# Patient Record
Sex: MALE
[De-identification: names, ages, dates, MRNs, and addresses within clinical notes are randomized per-mention and may not be internally consistent; named-entity substitution may affect disease eponyms.]

---

## 2018-02-05 PROBLEM — Z00.00 ENCOUNTER FOR PREVENTIVE HEALTH EXAMINATION: Status: ACTIVE | Noted: 2018-02-05

## 2018-02-07 ENCOUNTER — OTHER (OUTPATIENT)
Age: 48
End: 2018-02-07

## 2018-02-07 DIAGNOSIS — M25.569 PAIN IN UNSPECIFIED KNEE: ICD-10-CM

## 2018-02-26 ENCOUNTER — APPOINTMENT (OUTPATIENT)
Dept: ORTHOPEDIC SURGERY | Facility: CLINIC | Age: 48
End: 2018-02-26

## 2018-09-06 ENCOUNTER — APPOINTMENT (OUTPATIENT)
Dept: ORTHOPEDIC SURGERY | Facility: CLINIC | Age: 48
End: 2018-09-06
Payer: COMMERCIAL

## 2018-09-06 VITALS
HEIGHT: 72 IN | SYSTOLIC BLOOD PRESSURE: 125 MMHG | BODY MASS INDEX: 30.88 KG/M2 | DIASTOLIC BLOOD PRESSURE: 81 MMHG | HEART RATE: 64 BPM | WEIGHT: 228 LBS

## 2018-09-06 DIAGNOSIS — Z80.9 FAMILY HISTORY OF MALIGNANT NEOPLASM, UNSPECIFIED: ICD-10-CM

## 2018-09-06 DIAGNOSIS — M54.5 LOW BACK PAIN: ICD-10-CM

## 2018-09-06 DIAGNOSIS — M54.16 RADICULOPATHY, LUMBAR REGION: ICD-10-CM

## 2018-09-06 DIAGNOSIS — Z82.61 FAMILY HISTORY OF ARTHRITIS: ICD-10-CM

## 2018-09-06 DIAGNOSIS — Z87.39 PERSONAL HISTORY OF OTHER DISEASES OF THE MUSCULOSKELETAL SYSTEM AND CONNECTIVE TISSUE: ICD-10-CM

## 2018-09-06 PROCEDURE — 72100 X-RAY EXAM L-S SPINE 2/3 VWS: CPT

## 2018-09-06 PROCEDURE — 99205 OFFICE O/P NEW HI 60 MIN: CPT

## 2018-09-06 RX ORDER — OXYCODONE HYDROCHLORIDE 30 MG/1
TABLET ORAL
Refills: 0 | Status: ACTIVE | COMMUNITY

## 2018-09-06 RX ORDER — ZOLPIDEM TARTRATE 5 MG/1
TABLET ORAL
Refills: 0 | Status: ACTIVE | COMMUNITY

## 2018-09-13 ENCOUNTER — APPOINTMENT (OUTPATIENT)
Dept: ORTHOPEDIC SURGERY | Facility: CLINIC | Age: 48
End: 2018-09-13
Payer: COMMERCIAL

## 2018-09-13 VITALS
DIASTOLIC BLOOD PRESSURE: 77 MMHG | HEART RATE: 81 BPM | SYSTOLIC BLOOD PRESSURE: 129 MMHG | BODY MASS INDEX: 30.88 KG/M2 | WEIGHT: 228 LBS | HEIGHT: 72 IN

## 2018-09-13 DIAGNOSIS — M43.10 SPONDYLOLISTHESIS, SITE UNSPECIFIED: ICD-10-CM

## 2018-09-13 DIAGNOSIS — M51.36 OTHER INTERVERTEBRAL DISC DEGENERATION, LUMBAR REGION: ICD-10-CM

## 2018-09-13 DIAGNOSIS — I27.82 CHRONIC PULMONARY EMBOLISM: ICD-10-CM

## 2018-09-13 PROCEDURE — 99214 OFFICE O/P EST MOD 30 MIN: CPT

## 2018-09-13 PROCEDURE — 72100 X-RAY EXAM L-S SPINE 2/3 VWS: CPT

## 2022-02-01 ENCOUNTER — APPOINTMENT (OUTPATIENT)
Dept: ORTHOPEDIC SURGERY | Facility: CLINIC | Age: 52
End: 2022-02-01
Payer: COMMERCIAL

## 2022-02-01 ENCOUNTER — NON-APPOINTMENT (OUTPATIENT)
Age: 52
End: 2022-02-01

## 2022-02-01 DIAGNOSIS — S62.91XA UNSPECIFIED FRACTURE OF RIGHT WRIST AND HAND, INITIAL ENCOUNTER FOR CLOSED FRACTURE: ICD-10-CM

## 2022-02-01 PROCEDURE — 29075 APPL CST ELBW FNGR SHORT ARM: CPT | Mod: RT

## 2022-02-01 PROCEDURE — A4550 SURGICAL TRAYS: CPT

## 2022-02-01 PROCEDURE — 99204 OFFICE O/P NEW MOD 45 MIN: CPT | Mod: 25

## 2022-02-01 PROCEDURE — 73130 X-RAY EXAM OF HAND: CPT | Mod: 26,RT
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
## 2022-02-16 ENCOUNTER — APPOINTMENT (OUTPATIENT)
Dept: ORTHOPEDIC SURGERY | Facility: CLINIC | Age: 52
End: 2022-02-16
Payer: COMMERCIAL

## 2022-02-16 PROCEDURE — 99213 OFFICE O/P EST LOW 20 MIN: CPT

## 2022-02-16 PROCEDURE — 73130 X-RAY EXAM OF HAND: CPT | Mod: RT
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
## 2022-02-16 NOTE — PHYSICAL EXAM
[de-identified] : right hand: \par cast removed. Skin intact, mild swelling, mild ecchymosis, no gross deformity. \par +mild TTP 4th MC head. \par Nontender distal radius, nontender ulnar styloid, nontender 1st, 2nd, 3rd and 5th MC. \par Normal finger cascade, no rotational deformity.\par Range of motion of the digits markedly reduced secondary to pain.  Fingertip to palm distance greater than 3 cm.\par sensation intact distally, cap refill < 2 sec  [de-identified] : The following radiographs were ordered and read by me during this patients visit. I reviewed each radiograph in detail with the patient and discussed the findings as highlighted below. \par 3 x-ray views of the right hand reveal a minimally displaced third metacarpal neck fx, no further displacement.
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
## 2022-03-16 ENCOUNTER — APPOINTMENT (OUTPATIENT)
Dept: ORTHOPEDIC SURGERY | Facility: CLINIC | Age: 52
End: 2022-03-16
Payer: COMMERCIAL

## 2022-03-16 PROCEDURE — 73130 X-RAY EXAM OF HAND: CPT | Mod: RT

## 2022-03-16 PROCEDURE — 99213 OFFICE O/P EST LOW 20 MIN: CPT
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
## 2022-03-16 NOTE — PHYSICAL EXAM
[de-identified] : right hand: \par  Skin intact, mild swelling, mild ecchymosis, no gross deformity. \par +mild TTP 4th MC head. \par Nontender distal radius, nontender ulnar styloid, nontender 1st, 2nd, 3rd and 5th MC. \par Normal finger cascade, no rotational deformity.\par Range of motion of the digits improved, fingertip to palm distance 1 cm.  Patient unable to make composite fist.\par sensation intact distally, cap refill < 2 sec  [de-identified] : The following radiographs were ordered and read by me during this patients visit. I reviewed each radiograph in detail with the patient and discussed the findings as highlighted below. \par 3 x-ray views of the right hand reveal a minimally displaced fourth metacarpal neck fx, no further displacement.  Interval callus formation

## 2022-05-02 ENCOUNTER — APPOINTMENT (OUTPATIENT)
Dept: ORTHOPEDIC SURGERY | Facility: CLINIC | Age: 52
End: 2022-05-02
Payer: COMMERCIAL

## 2022-05-02 DIAGNOSIS — S62.334A DISPLACED FRACTURE OF NECK OF FOURTH METACARPAL BONE, RIGHT HAND, INITIAL ENCOUNTER FOR CLOSED FRACTURE: ICD-10-CM

## 2022-05-02 PROCEDURE — 20550 NJX 1 TENDON SHEATH/LIGAMENT: CPT

## 2022-05-02 PROCEDURE — 99213 OFFICE O/P EST LOW 20 MIN: CPT | Mod: 25

## 2022-05-10 PROBLEM — S62.334A CLOSED DISPLACED FRACTURE OF NECK OF FOURTH METACARPAL BONE OF RIGHT HAND, INITIAL ENCOUNTER: Status: ACTIVE | Noted: 2022-02-01

## 2022-05-10 NOTE — ASSESSMENT
[FreeTextEntry1] : 51 year male presents for reevaluation of his right hand 4th MC fx, 13 weeks s/p closed treatment and locking of right little finger. Exam findings consistent with trigger finger. \par \par Nature and history of the condition was discussed at length. Risks and benefits of conservative observation and treatment versus cortisone injection versus surgical release were discussed in detail. He was recommended for a cortisone injection. Injection was performed in office today, patient tolerated procedure well with no complications. He was cleared to continue activity as tolerated. If symptoms persist, patient to follow up in 4 weeks for repeat injection. \par \par All questions and concerns have been addressed, and the patient is in agreement with the above plan.

## 2022-05-10 NOTE — PHYSICAL EXAM
[Normal] : Oriented to person, place, and time, insight and judgement were intact and the affect was normal [de-identified] : right hand: \par  Skin intact, mild swelling, mild ecchymosis, no gross deformity. \par no TTP 4th MC fx site, +ttp at the A1 pulley of the small finger with triggering upon flexion/extension. \par Normal finger cascade, no rotational deformity.\par Range of motion of the digits improved, fingertip to palm distance 1 cm.  Patient unable to make composite fist.\par sensation intact distally, cap refill < 2 sec

## 2022-05-10 NOTE — PROCEDURE
[FreeTextEntry1] : My impression is that this patient has stenosing tenosynovitis of the finger, more commonly known as a trigger finger.  I recommended that the patient undergo a trigger finger injection. The risks, benefits, and alternatives were discussed with the patient in detail. This included (but was not limited to) pain, infection, subcutaneous atrophy, skin depigmentation, etc... They agreed to undergo the steroid injection. Under informed consent and sterile conditions, 1/2 cc of 1% plain lidocaine and 1 cc of Dexamethasone 4mg was precisely injected into the patient's right little finger. A sterile Band-Aid was placed.  It is my hope that this significantly alleviates the patient's symptoms.

## 2022-05-10 NOTE — ADDENDUM
[FreeTextEntry1] : ISridevi assisted with documentation on 05/02/2022  acting as scribe for Dr. Madelin Canales.
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
## 2022-05-31 ENCOUNTER — APPOINTMENT (OUTPATIENT)
Dept: ORTHOPEDIC SURGERY | Facility: CLINIC | Age: 52
End: 2022-05-31
Payer: COMMERCIAL

## 2022-05-31 DIAGNOSIS — M65.351 TRIGGER FINGER, RIGHT LITTLE FINGER: ICD-10-CM

## 2022-05-31 PROCEDURE — 99213 OFFICE O/P EST LOW 20 MIN: CPT | Mod: 25

## 2022-05-31 PROCEDURE — 20550 NJX 1 TENDON SHEATH/LIGAMENT: CPT

## 2022-05-31 NOTE — PHYSICAL EXAM
[de-identified] : There is positive swelling and tenderness localized to the A1 pulley of the right fifth digit with locking upon compression of the pulley.. There is no evidence of infection. No tenderness of the MP, PIP or DIP joint and no evidence of instability bilaterally. The FDS FDP and extensor mechanism is intact without instability and with 5 over 5 strength bilaterally in the digits.  [de-identified] : No imaging done today.

## 2022-05-31 NOTE — ASSESSMENT
[FreeTextEntry1] : 51 year old male presents for evaluation of locking and stiffness of right small finger secondary to trigger finger s/p cortisone injection 5/2/22.  \par \par Nature and history of the condition was discussed at length. Risks and benefits of conservative observation and treatment versus repeat cortisone injection versus surgical trigger finger release were discussed in detail. Cortisone injection was performed in office today, patient tolerated procedure well with no complications. He will follow up with me on an as needed basis. \par \par All questions and concerns have been addressed, and the patient is in agreement with the above plan.

## 2022-05-31 NOTE — HISTORY OF PRESENT ILLNESS
Pt called - she started [address] yesterday and has been vomiting since - can anything be recommended for pt to help? [FreeTextEntry1] : 05/02/2022: Patient presents for reevaluation of his right hand 4th MC fx, 13 weeks s/p closed treatment. He complains of occasional locking of his small finger c/w trigger finger. \par \par 05/31/2022: Patient presents for reevaluation of locking of right small finger secondary to trigger finger. He is status post cortisone injection for trigger finger 5/2/22. He complains of persistent locking and stiffness and is interested in a repeat injection today.

## 2022-05-31 NOTE — PROCEDURE
[FreeTextEntry1] : My impression is that this patient has stenosing tenosynovitis of the finger, more commonly known as a trigger finger.  I recommended that the patient undergo a trigger finger injection. The risks, benefits, and alternatives were discussed with the patient in detail. This included (but was not limited to) pain, infection, subcutaneous atrophy, skin depigmentation, etc... They agreed to undergo the steroid injection. Under informed consent and sterile conditions, 1/2 cc of 1% plain lidocaine and 1 cc of Dexamethasone 4mg was precisely injected into the patient's right small finger. A sterile Band-Aid was placed. It is my hope that this significantly alleviates the patient's symptoms.

## 2022-05-31 NOTE — ADDENDUM
[FreeTextEntry1] : ISridevi assisted with documentation on 05/31/2022  acting as scribe for Dr. Madelin Canales.

## 2025-01-27 ENCOUNTER — NON-APPOINTMENT (OUTPATIENT)
Age: 55
End: 2025-01-27